# Patient Record
Sex: MALE | Race: WHITE
[De-identification: names, ages, dates, MRNs, and addresses within clinical notes are randomized per-mention and may not be internally consistent; named-entity substitution may affect disease eponyms.]

---

## 2021-08-26 ENCOUNTER — HOSPITAL ENCOUNTER (INPATIENT)
Dept: HOSPITAL 97 - ER | Age: 66
LOS: 1 days | DRG: 871 | End: 2021-08-27
Attending: HOSPITALIST | Admitting: HOSPITALIST
Payer: COMMERCIAL

## 2021-08-26 VITALS — BODY MASS INDEX: 61.3 KG/M2

## 2021-08-26 DIAGNOSIS — A41.9: Primary | ICD-10-CM

## 2021-08-26 DIAGNOSIS — K92.2: ICD-10-CM

## 2021-08-26 DIAGNOSIS — J69.0: ICD-10-CM

## 2021-08-26 DIAGNOSIS — Z20.822: ICD-10-CM

## 2021-08-26 DIAGNOSIS — E66.01: ICD-10-CM

## 2021-08-26 DIAGNOSIS — I46.9: ICD-10-CM

## 2021-08-26 DIAGNOSIS — N17.9: ICD-10-CM

## 2021-08-26 DIAGNOSIS — I47.2: ICD-10-CM

## 2021-08-26 LAB
ALBUMIN SERPL BCP-MCNC: 3.2 G/DL (ref 3.4–5)
ALP SERPL-CCNC: 66 U/L (ref 45–117)
ALT SERPL W P-5'-P-CCNC: 337 U/L (ref 12–78)
AST SERPL W P-5'-P-CCNC: 281 U/L (ref 15–37)
BUN BLD-MCNC: 17 MG/DL (ref 7–18)
COHGB MFR BLDA: 0.8 % (ref 0–1.5)
COHGB MFR BLDA: 0.9 % (ref 0–1.5)
GLUCOSE SERPLBLD-MCNC: 321 MG/DL (ref 74–106)
HCT VFR BLD CALC: 40 % (ref 39.6–49)
HCT VFR BLD CALC: 43 % (ref 39.6–49)
INR BLD: 1.08
LYMPHOCYTES # SPEC AUTO: 0.6 K/UL (ref 0.7–4.9)
LYMPHOCYTES # SPEC AUTO: 4.7 K/UL (ref 0.7–4.9)
MAGNESIUM SERPL-MCNC: 2.8 MG/DL (ref 1.8–2.4)
MORPHOLOGY BLD-IMP: (no result)
MORPHOLOGY BLD-IMP: (no result)
NT-PROBNP SERPL-MCNC: 192 PG/ML (ref ?–125)
OXYHGB MFR BLDA: 94.4 % (ref 94–97)
OXYHGB MFR BLDA: 97.2 % (ref 94–97)
PMV BLD: 8 FL (ref 7.6–11.3)
PMV BLD: 8.4 FL (ref 7.6–11.3)
POLYCHROMASIA BLD QL SMEAR: SLIGHT
POTASSIUM SERPL-SCNC: 4.3 MMOL/L (ref 3.5–5.1)
RBC # BLD: 3.91 M/UL (ref 4.33–5.43)
RBC # BLD: 4.51 M/UL (ref 4.33–5.43)
SAO2 % BLDA: 96.3 % (ref 92–98.5)
SAO2 % BLDA: 99 % (ref 92–98.5)
TROPONIN (EMERG DEPT USE ONLY): 0.45 NG/ML (ref 0–0.04)

## 2021-08-26 PROCEDURE — 84145 PROCALCITONIN (PCT): CPT

## 2021-08-26 PROCEDURE — 87205 SMEAR GRAM STAIN: CPT

## 2021-08-26 PROCEDURE — 0BH17EZ INSERTION OF ENDOTRACHEAL AIRWAY INTO TRACHEA, VIA NATURAL OR ARTIFICIAL OPENING: ICD-10-PCS

## 2021-08-26 PROCEDURE — 82947 ASSAY GLUCOSE BLOOD QUANT: CPT

## 2021-08-26 PROCEDURE — 94003 VENT MGMT INPAT SUBQ DAY: CPT

## 2021-08-26 PROCEDURE — 36415 COLL VENOUS BLD VENIPUNCTURE: CPT

## 2021-08-26 PROCEDURE — 93005 ELECTROCARDIOGRAM TRACING: CPT

## 2021-08-26 PROCEDURE — 92950 HEART/LUNG RESUSCITATION CPR: CPT

## 2021-08-26 PROCEDURE — 70450 CT HEAD/BRAIN W/O DYE: CPT

## 2021-08-26 PROCEDURE — 71045 X-RAY EXAM CHEST 1 VIEW: CPT

## 2021-08-26 PROCEDURE — 95816 EEG AWAKE AND DROWSY: CPT

## 2021-08-26 PROCEDURE — 87040 BLOOD CULTURE FOR BACTERIA: CPT

## 2021-08-26 PROCEDURE — 84484 ASSAY OF TROPONIN QUANT: CPT

## 2021-08-26 PROCEDURE — 86900 BLOOD TYPING SEROLOGIC ABO: CPT

## 2021-08-26 PROCEDURE — 83605 ASSAY OF LACTIC ACID: CPT

## 2021-08-26 PROCEDURE — 80048 BASIC METABOLIC PNL TOTAL CA: CPT

## 2021-08-26 PROCEDURE — 93306 TTE W/DOPPLER COMPLETE: CPT

## 2021-08-26 PROCEDURE — 82805 BLOOD GASES W/O2 SATURATION: CPT

## 2021-08-26 PROCEDURE — 83735 ASSAY OF MAGNESIUM: CPT

## 2021-08-26 PROCEDURE — 80076 HEPATIC FUNCTION PANEL: CPT

## 2021-08-26 PROCEDURE — 84100 ASSAY OF PHOSPHORUS: CPT

## 2021-08-26 PROCEDURE — 31500 INSERT EMERGENCY AIRWAY: CPT

## 2021-08-26 PROCEDURE — 94002 VENT MGMT INPAT INIT DAY: CPT

## 2021-08-26 PROCEDURE — 85025 COMPLETE CBC W/AUTO DIFF WBC: CPT

## 2021-08-26 PROCEDURE — 99292 CRITICAL CARE ADDL 30 MIN: CPT

## 2021-08-26 PROCEDURE — 5A1935Z RESPIRATORY VENTILATION, LESS THAN 24 CONSECUTIVE HOURS: ICD-10-PCS

## 2021-08-26 PROCEDURE — 93970 EXTREMITY STUDY: CPT

## 2021-08-26 PROCEDURE — 86901 BLOOD TYPING SEROLOGIC RH(D): CPT

## 2021-08-26 PROCEDURE — 83880 ASSAY OF NATRIURETIC PEPTIDE: CPT

## 2021-08-26 PROCEDURE — 51702 INSERT TEMP BLADDER CATH: CPT

## 2021-08-26 PROCEDURE — 06HY33Z INSERTION OF INFUSION DEVICE INTO LOWER VEIN, PERCUTANEOUS APPROACH: ICD-10-PCS

## 2021-08-26 PROCEDURE — 80053 COMPREHEN METABOLIC PANEL: CPT

## 2021-08-26 PROCEDURE — 86850 RBC ANTIBODY SCREEN: CPT

## 2021-08-26 PROCEDURE — 99291 CRITICAL CARE FIRST HOUR: CPT

## 2021-08-26 PROCEDURE — 85610 PROTHROMBIN TIME: CPT

## 2021-08-26 PROCEDURE — 85730 THROMBOPLASTIN TIME PARTIAL: CPT

## 2021-08-26 RX ADMIN — Medication SCH ML: at 21:00

## 2021-08-26 NOTE — RAD REPORT
EXAM DESCRIPTION:  RAD - Chest Single View - 8/26/2021 9:49 am

 

CLINICAL HISTORY:  md hinds

Chest pain.

 

COMPARISON:  No comparisons

 

FINDINGS:  Portable technique limits examination quality.

 

Endotracheal intubation is noted with tip at the level of the superior aortic arch. Enteric tube desc
ends into the stomach. Mild to moderate bilateral pulmonary opacities are present probably representi
ng pulmonary edema or pulmonary infection.Heart size is mildly prominent.

## 2021-08-26 NOTE — RAD REPORT
EXAM DESCRIPTION:  CT - Head Brain Wo Cont - 8/26/2021 11:45 am

 

CLINICAL HISTORY:  ams

Headache, drowsiness

 

COMPARISON:  No comparisons

 

TECHNIQUE:  All CT scans are performed using dose optimization technique as appropriate and may inclu
de automated exposure control or mA/KV adjustment according to patient size.

 

FINDINGS:  No intracranial hemorrhage, hydrocephalus or extra-axial fluid collection.No areas of brai
n edema or evidence of midline shift.

 

Nasopharyngeal soft tissues are prominent along prominence of the posterior nasal cavity soft tissues
. The calvarium is intact.

 

IMPRESSION:  No acute intracranial abnormality.

## 2021-08-26 NOTE — ER
Nurse's Notes                                                                                     

 [facility]                                                                 

Name: Theo Minor                                                                          

Age: 66 yrs                                                                                       

Sex: Male                                                                                         

: 1955                                                                                   

MRN: [id number]                                                                                   

Arrival Date: 2021                                                                          

Time: 08:36                                                                                       

Account#: [id number]                                                                            

Bed 2                                                                                             

Private MD:                                                                                       

Diagnosis: Respiratory arrest;Cardiac arrest, cause unspecified                                   

                                                                                                  

Presentation:                                                                                     

                                                                                             

08:33 Chief complaint: EMS states: FOUND DOWN IN FULL ARREST. Care prior to arrival:          bp  

      Medication(s) given: EPI x3 IV initiated. LEFT TIB IO. Compressions began prior to          

      arrival.                                                                                    

08:33 Method Of Arrival: EMS: [facility]                                                    bp  

08:33 Acuity: AFTAB 1                                                                           bp  

08:33 Coronavirus screen: Client presents with at least one sign or symptom that may indicate bp  

      coronavirus-19. Provider contacted for isolation considerations. Ebola Screen: No           

      symptoms or risks identified at this time. Initial Sepsis Screen: Does the patient meet     

      any 2 criteria? Temp <36.0*C (96.8*F)) or > 38.3*C (100.9*F). Altered Mental Status.        

      Does the patient have a suspected source of infection? No. Patient's initial sepsis         

      screen is negative. Risk Assessment: Do you want to hurt yourself or someone else?          

      Patient reports no desire to harm self or others. Onset of symptoms was 2021     

      at 07:30. Transition of care: patient was received from another setting of care             

      (long-term care facility), [facility].                                         

                                                                                                  

Triage Assessment:                                                                                

08:33 General: SEE CPR SHEET. Pain: Unable to use pain scale. Patient is unresponsive.        bp  

                                                                                                  

Historical:                                                                                       

- Allergies:                                                                                      

10:31 No Known Allergies;                                                                     bp  

- Home Meds:                                                                                      

10:31 Unable to obtain [Active];                                                              bp  

- PMHx:                                                                                           

10:31 Unable to Obtain;                                                                       bp  

                                                                                                  

- Immunization history:: Adult Immunizations unknown, UNKNOWN.                                    

- Social history:: Smoking status: unknown.                                                       

                                                                                                  

                                                                                                  

Screenin:33 Abuse screen: Denies threats or abuse. Denies injuries from another. Nutritional        bp  

      screening: No deficits noted. Tuberculosis screening: No symptoms or risk factors           

      identified.                                                                                 

                                                                                                  

Assessment:                                                                                       

08:33 CPR assessment: unresponsive, pupils fixed \T\ dilated, no respiratory effort, intubated, bp

      Ambu ventilation, pulses present w/ compressions. Cardiac rhythm is PEA. General:           

      Appears obese, Behavior is unresponsive. Neuro: Level of Consciousness is unresponsive,     

      Oriented to none. EENT: No deficits noted. Cardiovascular: Rhythm is PEA. Respiratory:      

      Airway via oral intubation Respiratory effort is NONE Respiratory pattern is NONE           

      Ventilator assessment: ET Tube: 7.5 21cm. at gum line. Ventilator Mode: Assist Control      

      (AC) Tidal Volume: 610 FiO2: 100%. Pressure Support: 0 HOB > 30 degrees. Breath sounds      

      with crackles bilaterally. GI: Abdomen is obese. : No signs and/or symptoms were          

      reported regarding the genitourinary system. Derm: No deficits noted.                       

08:35 Reassessment: ROSC.                                                                     bp  

08:45 Reassessment: PEA ON MONITOR. CPR RESTARTED.                                            bp  

08:48 Reassessment: ROSC. .                                                            bp  

10:30 Reassessment: TRANSFER INITIATED. PT ON PERLA AC16, , FIO2 100, DOPAMINE AT 10      bp  

      MCG/KG/MIN, EPI AT 2MCG/MIN.                                                                

11:15 Reassessment: TO CT WITH RT AND RAD TECH.                                               bp  

11:30 Reassessment: RETURNED FROM CT.                                                         bp  

13:31 Reassessment: Checked with [facility] about transfer, [facility] with the transfer   aa5 

      center states they are still pending CCU bed. Dr. Swift was notified of update.          

15:00 Reassessment: Reassessment: EEG AT B/S. EEG GROSSLY ABNORMAL. MD NOTIFIED. Neuro: Level bp  

      of Consciousness is unresponsive, Oriented to none.                                         

16:00 Reassessment: [facility] CONTACTED. PER STAFF, PT HAS NO LIVING FAMILY AND NO CLOSE    bp  

      ASSOCIATES, 2/2 HOMELESSNESS. Neuro: Level of Consciousness is unresponsive, Oriented       

      to none.                                                                                    

17:00 [facility]ass[facility]. PT DOES NOT CURRENTLY MEET CRITERIA FOR         bp  

      DONATION 2/2 PH <7.3.                                                                       

                                                                                                  

Vital Signs:                                                                                      

08:33 Weight 175 kg;                                                                          bp  

08:45  / 78; Pulse 81; Resp 18; Pulse Ox 100% on 100% FiO2 ETT vent;                    bp  

09:00 BP 98 / 55; Pulse 67; Resp 16; Pulse Ox 99% ;                                           bp  

09:15 BP 71 / 52; Pulse 68; Resp 18; Temp 95.7; Pulse Ox 98% ;                                bp  

09:30  / 63; Pulse 84; Resp 14; Temp 97.8; Pulse Ox 98% ;                               bp  

09:45  / 60; Pulse 104; Resp 12; Temp 98.2; Pulse Ox 99% ;                              bp  

10:00  / 55; Pulse 94; Resp 14; Temp 97.6; Pulse Ox 100% ;                              bp  

10:15  / 48; Pulse 82; Resp 16; Temp 97.2; Pulse Ox 99% ;                               bp  

10:30  / 49; Pulse 79; Resp 16; Temp 97; Pulse Ox 99% ;                                 bp  

10:45  / 58; Pulse 76; Resp 16; Temp 96.8; Pulse Ox 98% ;                               bp  

11:00  / 54; Pulse 73; Resp 16; Temp 96.5; Pulse Ox 98% ;                               bp  

11:15  / 47; Pulse 73; Resp 10; Temp 96.5; Pulse Ox 99% ;                               bp  

11:30 BP 75 / 46; Pulse 71; Resp 16; Temp 96.3; Pulse Ox 98% ;                                bp  

11:45 BP 71 / 40; Pulse 71; Resp 15; Temp 96.3; Pulse Ox 98% ;                                bp  

12:00 BP 90 / 38; Pulse 61; Resp 16; Temp 96.2; Pulse Ox 92% ;                                bp  

12:30 BP 76 / 44; Pulse 62; Resp 16; Temp 96; Pulse Ox 100% ;                                 bp  

13:00 BP 68 / 47; Pulse 57; Resp 16; Temp 96; Pulse Ox 100% ;                                 bp  

13:30 BP 72 / 43; Pulse 56; Resp 16; Temp 96.; Pulse Ox 100% ;                                bp  

14:00 BP 85 / 42; Pulse 55; Resp 17; Temp 96.9; Pulse Ox 100% ;                               bp  

14:30 BP 86 / 48; Pulse 58; Resp 20; Temp 95.8; Pulse Ox 100% ;                               bp  

15:00 BP 88 / 43; Pulse 58; Resp 21; Temp 95.8; Pulse Ox 100% ;                               bp  

15:30 BP 89 / 59; Pulse 59; Resp 22; Temp 95.8; Pulse Ox 100% ;                               bp  

16:00 BP 92 / 51; Pulse 61; Resp 20; Temp 95.9; Pulse Ox 100% ;                               bp  

16:30 BP 94 / 59; Pulse 65; Resp 22; Temp 96.1; Pulse Ox 100% ;                               bp  

17:00  / 50; Pulse 60; Resp 21; Temp 96.5; Pulse Ox 100% ;                              bp  

17:30  / 72; Pulse 74; Resp 18; Temp 97.1; Pulse Ox 100% ;                              bp  

                                                                                                  

ED Course:                                                                                        

08:33 Patient has correct armband on for positive identification. Placed in gown. Bed in low  bp  

      position. Side rails up X2. Intubation: Ventilated with 100% bag valve mask (BVM) prior     

      to procedure. 7.5 Fr. ETT placed orally. BY EMS PTA. Placement verified by CXR,             

      auscultating bilateral breath sounds, Ventilated with Ambu bag. ventilator.                 

08:36 Patient arrived in ED.                                                                  am2 

08:40 Inserted saline lock: 18 gauge in left antecubital area, using aseptic technique. Blood dh3 

      collected.                                                                                  

09:11 Richardson cath inserted, using sterile technique, 16 Fr., by me, balloon inflated, to       dh3 

      gravity drainage, returned clear yellow urine. Patient tolerated CPR/intubated.             

09:27 Marek Swift MD is Attending Physician.                                             [facility] 

09:34 Ag Grajeda PA is PHCP.                                                              [facility] 

09:41 Jorden Malone, RN is Primary Nurse.                                                    bp  

09:48 First set of blood cultures drawn by ED staff.                                          dh3 

09:49 XRAY Chest (1 view) In Process Unspecified.                                             EDMS

09:51 Second set of blood cultures drawn by ED staff.                                         dh3 

09:59 Lactate Sent.                                                                           dh3 

10:01 Triage completed.                                                                       bp  

10:31 Arm band placed on right wrist.                                                         bp  

10:36 Notified Nurse Practitioner and/or Physician Assistant of a critical lab result(s),     sv  

      lactate-10.1.                                                                               

10:57 Transfer initiated with [facility].                               em1 

11:45 CT Head Brain wo Cont In Process Unspecified.                                           EDMS

14:20 Transfer declined due to capacity at [facility].                                             em1 

14:24 Transfer initiated with [facility].                          em1 

14:34 Transfer declined due to capacity at [facility].                                             em1 

17:43 Zita Lomax MD is Hospitalizing Provider.                                           [facility] 

                                                                                             

07:00 No provider procedures requiring assistance completed. Patient admitted, IV remains in  sv  

      place. intact.                                                                              

07:18 Primary Nurse role handed off by Jorden Malone, RN                                       

07:18 Mallorie, Roslyn, RN is Primary Nurse.                                                  sv  

                                                                                                  

Administered Medications:                                                                         

                                                                                             

13:31 Discontinued: Epinephrine Drip - (EPINEPHrine (PF) 4 mg, Sodium Chloride 0.9% 250 ml)   edmundo 

      IV at calculated rate Per protocol; Start at 1 mcg/min; titrate upto 10 mcg/min to          

      maintain SBP>90 mmHg                                                                        

09:30 Drug: Dopamine drip 5 mcg/kg/min - (DOPamine 400 mg, D5W 250 ml) Route: IV; Rate:       bp  

      calculated rate; Site: right femoral;                                                       

09:30 Drug: Epinephrine Drip - (EPINEPHrine (PF) 4 mg, Sodium Chloride 0.9% 250 ml) Route:    bp  

      IV; Rate: calculated rate; Site: right femoral;                                             

11:05 Drug: ProTONIX (pantoprazole) 40 mg Route: IVP; Site: right femoral;                    bp  

11:15 Drug: Zosyn (piperacillin-tazobactam) 3.375 grams Route: IVPB; Infused Over: 60 mins;   bp  

      Site: right upper arm;                                                                      

13:00 Drug: Levophed (norepinephrine) (4 mg/250 mL D5W 4 mcg/min Route: IV; Rate: calculated  bp  

      rate; Site: right femoral;                                                                  

                                                                                             

04:15 Drug: Gelacio-Synephrine (phenylephrine) 100 mcg/min Route: IV; Rate: calculated rate;      jb4 

      Site: right femoral;                                                                        

                                                                                                  

                                                                                                  

Outcome:                                                                                          

                                                                                             

10:40 ER care complete, transfer ordered by MD.                                               edmundo 

17:44 Decision to Hospitalize by Provider.                                                    jaky 

                                                                                             

07:00 Admitted to ER Hold.  Please see [facility] for further documentation.                    sv  

      Instructed on the need for admit.                                                           

18:36 Patient left the ED.                                                                      

                                                                                                  

Signatures:                                                                                       

Dispatcher [employer]                           EDMS                                                 

Roslyn Nobles, RN                    RN   Marek Nickerson MD MD cha Mickail, Joel, PA                       PA   Jung Salazar                               em1                                                  

Sagrario Thomas, RN                     RN   pravin5                                                  

Frederick Palacios RN                       RN   jb4                                                  

Staci Cuenca Deanna                              3                                                  

Jorden Malone RN                      RN   bp                                                   

                                                                                                  

Corrections: (The following items were deleted from the chart)                                    

                                                                                             

10:32 10:31 PMHx: Angina pectoris; bp                                                         bp  

                                                                                                  

**************************************************************************************************

## 2021-08-26 NOTE — EDPHYS
Physician Documentation                                                                           

 [facility]                                                                 

Name: Theo Minor                                                                          

Age: 66 yrs                                                                                       

Sex: Male                                                                                         

: 1955                                                                                   

MRN: [id number]                                                                                   

Arrival Date: 2021                                                                          

Time: 08:36                                                                                       

Account#: [id number]                                                                            

Bed 2                                                                                             

Private MD:                                                                                       

ED Physician Marek Swift                                                                      

HPI:                                                                                              

                                                                                             

09:45 This 66 yrs old  Male presents to ER via EMS with complaints of CPR.           [facility] 

09:45 Preceding the arrest, the patient was found down by nursing home staff. The arrest      jmm 

      occurred at nursing home. Pre-hospital course: The arrest was not witnessed by others.      

      EMS care prior to arrival: initiation of ACLS, peripheral IV, intubation ACLS details:      

      Airway: oral intubation, Medications given by EMS prior to arrival -. It is unknown         

      whether or not the patient has had similar symptoms in the past.                            

                                                                                                  

Historical:                                                                                       

- Allergies:                                                                                      

10:31 No Known Allergies;                                                                     bp  

- Home Meds:                                                                                      

10:31 Unable to obtain [Active];                                                              bp  

- PMHx:                                                                                           

10:31 Unable to Obtain;                                                                       bp  

                                                                                                  

- Immunization history:: Adult Immunizations unknown, UNKNOWN.                                    

- Social history:: Smoking status: unknown.                                                       

                                                                                                  

                                                                                                  

ROS:                                                                                              

09:45 Unable to obtain ROS due to comatose state.                                             [facility] 

                                                                                                  

Exam:                                                                                             

09:45 Constitutional: The patient appears obese.                                              [facility] 

09:45 Head/face: Noted is cyanosis noted.                                                         

09:45 Eyes: Conjunctiva: normal.                                                                  

09:45 Chest/axilla: cyanotic.                                                                     

09:45 Cardiovascular: no palpable pulse.                                                          

09:45 Respiratory: intubated.                                                                     

09:45 Abdomen/GI: Inspection: obese                                                               

09:45 Musculoskeletal/extremity:                                                                  

09:45 Neuro: Orientation: unable to test, the patient is intubated, Mentation: unable to          

      test, the patient is intubated, Memory: unable to test, the patient is intubated.           

09:45 Psych:                                                                                      

                                                                                                  

Vital Signs:                                                                                      

08:33 Weight 175 kg;                                                                          bp  

08:45  / 78; Pulse 81; Resp 18; Pulse Ox 100% on 100% FiO2 ETT vent;                    bp  

09:00 BP 98 / 55; Pulse 67; Resp 16; Pulse Ox 99% ;                                           bp  

09:15 BP 71 / 52; Pulse 68; Resp 18; Temp 95.7; Pulse Ox 98% ;                                bp  

09:30  / 63; Pulse 84; Resp 14; Temp 97.8; Pulse Ox 98% ;                               bp  

09:45  / 60; Pulse 104; Resp 12; Temp 98.2; Pulse Ox 99% ;                              bp  

10:00  / 55; Pulse 94; Resp 14; Temp 97.6; Pulse Ox 100% ;                              bp  

10:15  / 48; Pulse 82; Resp 16; Temp 97.2; Pulse Ox 99% ;                               bp  

10:30  / 49; Pulse 79; Resp 16; Temp 97; Pulse Ox 99% ;                                 bp  

10:45  / 58; Pulse 76; Resp 16; Temp 96.8; Pulse Ox 98% ;                               bp  

11:00  / 54; Pulse 73; Resp 16; Temp 96.5; Pulse Ox 98% ;                               bp  

11:15  / 47; Pulse 73; Resp 10; Temp 96.5; Pulse Ox 99% ;                               bp  

11:30 BP 75 / 46; Pulse 71; Resp 16; Temp 96.3; Pulse Ox 98% ;                                bp  

11:45 BP 71 / 40; Pulse 71; Resp 15; Temp 96.3; Pulse Ox 98% ;                                bp  

12:00 BP 90 / 38; Pulse 61; Resp 16; Temp 96.2; Pulse Ox 92% ;                                bp  

12:30 BP 76 / 44; Pulse 62; Resp 16; Temp 96; Pulse Ox 100% ;                                 bp  

13:00 BP 68 / 47; Pulse 57; Resp 16; Temp 96; Pulse Ox 100% ;                                 bp  

13:30 BP 72 / 43; Pulse 56; Resp 16; Temp 96.; Pulse Ox 100% ;                                bp  

14:00 BP 85 / 42; Pulse 55; Resp 17; Temp 96.9; Pulse Ox 100% ;                               bp  

14:30 BP 86 / 48; Pulse 58; Resp 20; Temp 95.8; Pulse Ox 100% ;                               bp  

15:00 BP 88 / 43; Pulse 58; Resp 21; Temp 95.8; Pulse Ox 100% ;                               bp  

15:30 BP 89 / 59; Pulse 59; Resp 22; Temp 95.8; Pulse Ox 100% ;                               bp  

16:00 BP 92 / 51; Pulse 61; Resp 20; Temp 95.9; Pulse Ox 100% ;                               bp  

16:30 BP 94 / 59; Pulse 65; Resp 22; Temp 96.1; Pulse Ox 100% ;                               bp  

17:00  / 50; Pulse 60; Resp 21; Temp 96.5; Pulse Ox 100% ;                              bp  

17:30  / 72; Pulse 74; Resp 18; Temp 97.1; Pulse Ox 100% ;                              bp  

                                                                                                  

Procedures:                                                                                       

09:45 Central Line: the site was prepped with in sterile fashion, a triple lumen catheter was [facility] 

      inserted, in the right femoral vein, in 2 attempts. placement was verified, by blood        

      return, the site was dressed with Tegaderm, using sterile technique.                        

                                                                                                  

MDM:                                                                                              

09:27 Patient medically screened.                                                             edmundo 

15:47 Data reviewed: vital signs, nurses notes. Counseling: I had a detailed discussion with  [facility] 

      the patient and/or guardian regarding: the historical points, exam findings, and any        

      diagnostic results supporting the discharge/admit diagnosis. ED course: I discussed the     

      patient with [facility]. Patient record number [id number] - [id number].                             

17:39 Counseling: I had a detailed discussion with the patient and/or guardian regarding:. ED [facility] 

      course: Patient remains on Levophed and dopamine drip. Patient was administered Zosyn       

      following chest x-ray results. I discussed the patient with Dr. Muir who will            

      reevaluate the EEG. Patient will need further evaluation before he can be a candidate       

      for organ donation. I discussed the patient with Dr. Lomax whom accepted the patient for     

      admission. .                                                                                

                                                                                                  

                                                                                             

08:53 Order name: Glucose, Ancillary Testing; Complete Time: 09:35                            EDMS

                                                                                             

09:00 Order name: Basic Metabolic Panel; Complete Time: 09:35                                 aa 

                                                                                             

09:00 Order name: CBC with Diff; Complete Time: 09:48                                         aa5 

                                                                                             

09:00 Order name: LFT's; Complete Time: 09:35                                                 aa5 

                                                                                             

09:00 Order name: Magnesium; Complete Time: 09:35                                             aa 

                                                                                             

09:00 Order name: NT PRO-BNP; Complete Time: :35                                            aa 

                                                                                             

09:00 Order name: PT-INR; Complete Time: :35                                                                                                                                             

09:00 Order name: Troponin (emerg Dept Use Only); Complete Time: 09:35                        [facility] 

                                                                                             

09:09 Order name: Manual Differential; Complete Time: 09:48                                   EDMS

                                                                                             

09:11 Order name: Blood Culture Adult (2)                                                     em1 

                                                                                             

09:12 Order name: Blood Culture                                                               EDMS

                                                                                             

09:28 Order name: ABG; Complete Time: 16:10                                                   em1 

                                                                                             

09:43 Order name: Lactate                                                                     bp  

                                                                                             

09:43 Order name: Lactate; Complete Time: 10:34                                               EDMS

                                                                                             

11:53 Order name: SARS-COV-2 RT PCR; Complete Time: 11:56                                     MS

                                                                                             

15:07 Order name: Lactate Sepsis 2 HR Follow-up; Complete Time: 15:14                         EDMS

                                                                                             

16:12 Order name: ABG; Complete Time: 02:13                                                   m 

                                                                                             

19:30 Order name: Comprehensive Metabolic Panel                                               EDMS

                                                                                             

19:30 Order name: Comprehensive Metabolic Panel; Complete Time: 15:09                         [facility]

                                                                                             

19:31 Order name: CBC with Automated Diff                                                     EDMS

                                                                                             

19:31 Order name: CBC with Automated Diff; Complete Time: 15:09                               MS

                                                                                             

19:31 Order name: Magnesium                                                                   EDMS

                                                                                             

19:31 Order name: Magnesium; Complete Time: 15:09                                             MS

                                                                                             

19:31 Order name: NT PRO-BNP                                                                  [facility]

                                                                                             

19:31 Order name: NT PRO-BNP; Complete Time: 15:09                                            MS

                                                                                             

19:31 Order name: Phosphorus                                                                  EDMS

                                                                                             

19:31 Order name: Phosphorus; Complete Time: 15:09                                            MS

                                                                                             

19:31 Order name: ABG Arterial Blood Gas                                                      [facility]

                                                                                             

09:00 Order name: XRAY Chest (1 view); Complete Time: 10:07                                   [facility] 

                                                                                             

09:00 Order name: EKG; Complete Time: 09:01                                                   5 

                                                                                             

09:00 Order name: Cardiac monitoring; Complete Time: 09:31                                    [facility] 

                                                                                             

09:00 Order name: EKG - Nurse/Tech; Complete Time: 09:31                                      [facility] 

                                                                                             

11:03 Order name: CT Head Brain wo Cont; Complete Time: 11:56                                 [facility] 

                                                                                             

14:00 Order name: EEG Request                                                                 [facility]

                                                                                             

19:31 Order name: Tube Feeding                                                                EDMS

                                                                                             

19:31 Order name: Echo with Doppler                                                           EDMS

                                                                                             

19:31 Order name: Echo with Doppler                                                           EDMS

                                                                                             

19:31 Order name: EEG Request                                                                 EDMS

                                                                                             

19:31 Order name: EEG Request                                                                 EDMS

                                                                                             

19:31 Order name: ABG Arterial Blood Gas                                                      EDMS

                                                                                             

19:31 Order name: Extrem Venous W Compress [place]                                                EDMS

                                                                                             

19:31 Order name: Extrem Venous W Compress [place]; Complete Time: 15:09                          EDMS

                                                                                             

19:32 Order name: CONS Physician Consult                                                      EDMS

                                                                                             

21:43 Order name: CBC with Automated Diff; Complete Time: 02:13                               EDMS

                                                                                             

22:18 Order name: Manual Differential; Complete Time: 02:13                                   EDMS

                                                                                             

22:37 Order name: Type and Screen; Complete Time: 02:13                                       EDMS

                                                                                             

23:13 Order name: ABO/RH no charge; Complete Time: 02:                                      EDMS

                                                                                             

02:44 Order name: Glucose, Ancillary Testing; Complete Time: 15:09                            EDMS

                                                                                             

04:24 Order name: ABG Arterial Blood Gas; Complete Time: 15:09                                EDMS

                                                                                             

06:46 Order name: Protime (+INR); Complete Time: 15:09                                        EDMS

                                                                                             

06:46 Order name: PTT, Activated Partial Thromb; Complete Time: 15:09                         EDMS

                                                                                             

07:07 Order name: NT PRO-BNP; Complete Time: 15:09                                            EDMS

                                                                                             

07:24 Order name: Procalcitonin; Complete Time: 15:09                                         EDMS

                                                                                             

12:04 Order name: Troponin I; Complete Time: 15:09                                            EDMS

                                                                                             

09:00 Order name: IV Saline Lock; Complete Time: 09:32                                        aa5 

                                                                                             

09:00 Order name: Labs collected and sent; Complete Time: 09:41                               aa5 

                                                                                             

09:00 Order name: O2 Per Protocol; Complete Time: 09:32                                       aa5 

                                                                                             

09:00 Order name: O2 Sat Monitoring; Complete Time: :32                                     aa5 

                                                                                                  

Administered Medications:                                                                         

13:31 Discontinued: Epinephrine Drip - (EPINEPHrine (PF) 4 mg, Sodium Chloride 0.9% 250 ml)   edmundo 

      IV at calculated rate Per protocol; Start at 1 mcg/min; titrate upto 10 mcg/min to          

      maintain SBP>90 mmHg                                                                        

09:30 Drug: Dopamine drip 5 mcg/kg/min - (DOPamine 400 mg, D5W 250 ml) Route: IV; Rate:       bp  

      calculated rate; Site: right femoral;                                                       

09:30 Drug: Epinephrine Drip - (EPINEPHrine (PF) 4 mg, Sodium Chloride 0.9% 250 ml) Route:    bp  

      IV; Rate: calculated rate; Site: right femoral;                                             

11:05 Drug: ProTONIX (pantoprazole) 40 mg Route: IVP; Site: right femoral;                    bp  

11:15 Drug: Zosyn (piperacillin-tazobactam) 3.375 grams Route: IVPB; Infused Over: 60 mins;   bp  

      Site: right upper arm;                                                                      

13:00 Drug: Levophed (norepinephrine) (4 mg/250 mL D5W 4 mcg/min Route: IV; Rate: calculated  bp  

      rate; Site: right femoral;                                                                  

                                                                                             

04:15 Drug: Gelacio-Synephrine (phenylephrine) 100 mcg/min Route: IV; Rate: calculated rate;      jb4 

      Site: right femoral;                                                                        

                                                                                                  

                                                                                                  

Disposition Summary:                                                                              

21 17:44                                                                                    

Hospitalization Ordered                                                                           

      Hospitalization Status: Inpatient Admission                                             [facility] 

      Provider: Zita Lomax 

      Condition: Stable(21 17:44)                                                       jmm 

      Problem: new(21 17:44)                                                            jmm 

      Symptoms: are unchanged(21 17:44)                                                 [facility] 

      Bed/Room Type: Standard                                                                 [facility] 

      Location: [facility] ER HOLD(21 19:44)                                                    

      Room Assignment: ERHOLD-(21 19:44)                                                  

      Diagnosis                                                                                   

        - Respiratory arrest                                                                  jmm 

        - Cardiac arrest, cause unspecified                                                   [facility] 

      Forms:                                                                                      

        - Medication Reconciliation Form                                                      jmm 

        - SBAR form                                                                           [facility] 

Addendum:                                                                                         

2021                                                                                        

     08:30 Co-signature as Attending Physician, Marek Swift MD I agree with the assessment and  c
ha

           plan of care.                                                                          

                                                                                                  

Signatures:                                                                                       

Dispatcher [employer]                           EDPaula Whittaker RN                        Marek Camarillo MD MD cha Mickail, Joel, PA                       PA   [id number]                                                  

Walter Cunningham MD MD rn Calderon, Audri, RN                     RN   [id number]                                                  

Frederick Palacios RN                       RN   [id number]                                                  

Jorden Malone RN                      RN   bp                                                   

                                                                                                  

Corrections: (The following items were deleted from the chart)                                    

                                                                                             

10:32 10:31 PMHx: Angina pectoris; bp                                                         bp  

10:54 09:49 CORONAVIRUS+MR.LAB.BRZ ordered. EDMS                                              EDMS

14:46 10:40 icu edmundo                                                                           [facility] 

14:46 10:40 Other Acute Care Facility edmundo                                                     [facility] 

14:46 10:40 Higher level of care [facility] 

14:46 10:40 Serious edmundo                                                                       [facility] 

14:46 10:40 new [facility] 

14:46 10:40 have improved edmundo                                                                 [facility] 

14:46 10:40 Acute respiratory failure edmundo                                                     [facility] 

14:46 10:40 Cardiac arrest due to other underlying condition - respiratory failure [facility] 

14:46 10:40 Morbid (severe) obesity with alveolar hypoventilation [facility] 

14:46 10:40 Hypotension, unspecified [facility] 

19:44 17:44 Telemetry/MedSurg (Inpatient) [facility]                                                 dw  

19:44 17:44 [facility]                                                                               dw  

                                                                                                  

**************************************************************************************************

## 2021-08-26 NOTE — P.HP
Certification for Inpatient


Patient admitted to: Inpatient


With expected LOS: >2 Midnights


Patient will require the following post-hospital care: None


Practitioner: I am a practitioner with admitting privileges, knowledge of 

patient current condition, hospital course, and medical plan of care.


Services: Services provided to patient in accordance with Admission requirements

found in Title 42 Section 412.3 of the Code of Federal Regulations





Patient History


Date of Service: 08/26/21


Reason for admission: Cardiac arrest


History of Present Illness: 





Patient is a 66-year-old gentlemen who he presents to our hospital with cardiac 

arrest.  Patient is a resident at [facility].  Patient was apparently

picked up from a homeless shelter in the [facility] and was transfer to 

[facility].  Patient was walking to the shower when patient fell.  It

appeared patient was unresponsive and did not have a pulse so they started CPR 

and EMS was notified.  When EMS arrived patient was asystole and CPR was 

continued.  Patient was given epi x 3 and intraosseous catheter obtained in the 

tibia.  Patient was brought to our hospital were there were able to obtain a 

pulse.  Patient was hypotensive and was started on Levophed and also given 

dopamine.  Transfer was initiated to [facility]  but no bed 

availability.  At the same time EEG was performed which appear to have very 

little activity.  Initially it was felt that patient may not have any brain 

activity but when seen by Neurology patient had a burst suppression pattern.  

Patient unlikely to survive this event.  Most likely will not have much brain 

activity over the next 48-72 hr.  Will go ahead and continue with hydration and 

pressor support.  Start Protonix drip.  Get Cardiology and Pulmonary 

consultation along with a neurology consultation.  As patient's prognosis is 

poor and  hemodynamically and neurologically unstable for transfer will go ahead

and keep him at our facility for admission and repeat EEG in the a.m..





I did speak to the [facility] director, and they stated that the only 

information they have on the patient is his Medicare and Medicaid and number.  

They do not have a 's license nor do they have any contact information 

that is working.  They had a number to an Dave Falk but the phone number that 

we were given is not working.  Will get  to assist and try to 

figure out a contact to the patient.


Allergies





No Known Allergies Allergy (Unverified 08/26/21 09:22)


   








- Past Medical/Surgical History


-: Unknown


-: Unknown





- Family History


  ** Father


Family History: Reviewed- Non-Contributory





- Social History


Smoking Status: Unknown if ever smoked


Smoking therapy provided: No


Patient receptive to therapy: No


Alcohol use: No


CD- Drugs: No


Caffeine use: No





Review of Systems


is unable to be obtained





Physical Examination





- Vital Signs


Temperature: 98 F


Blood Pressure: 100/60


Pulse: 60


Respirations: 18


Pulse Ox (%): 95





- Physical Exam


General: Other (intubated and )


HEENT: Atraumatic, Normocephalic


Neck: Supple, 2+ carotid pulse no bruit, JVD not distended


Respiratory: Diminished


Cardiovascular: Regular rate/rhythm, Normal S1 S2, Systolic murmur


Gastrointestinal: Normal bowel sounds, Soft and benign, Non-distended


Musculoskeletal: No clubbing, No swelling, No contractures


Integumentary: No rashes


Neurological: Other





- Studies


Laboratory Data (last 24 hrs)





08/26/21 09:00: PT 12.4, INR 1.08


08/26/21 09:00: WBC 7.50, Hgb 12.0 L, Hct 40.0, Plt Count 235


08/26/21 09:00: Sodium 137, Potassium 4.3, BUN 17, Creatinine 1.48 H, Glucose 

321 H, Magnesium 2.8 H, Total Bilirubin 0.3,  H,  H*, Alkaline 

Phosphatase 66








Assessment & Plan





- Problems (Diagnosis)


(1) Cardiac arrest


Status: Acute   





(2) GIB (gastrointestinal bleeding)


Status: Acute   





(3) MICHAEL (acute kidney injury)


Status: Acute   





- Plan





Plan: 


1. Patient with very little brain activity. Patient had a burst suppression 

pattern noted by neurology. At this time, patient is a full code and will 

continue to do whatever we can. Repeat EEG in the morning. Continue with 

vasopressor support. Continuous sedation as needed. Continue antibiotic therapy.




Discharge Plan: Other


Plan to discharge in: Greater than 2 days





- Advance Directives


Does patient have a Living Will: No


Does patient have a Durable POA for Healthcare: No


Critical Care: Yes


Time Spent Managing PTS Care (In Minutes): 60

## 2021-08-27 VITALS — OXYGEN SATURATION: 95 %

## 2021-08-27 LAB
ALBUMIN SERPL BCP-MCNC: 3.1 G/DL (ref 3.4–5)
ALP SERPL-CCNC: 74 U/L (ref 45–117)
ALT SERPL W P-5'-P-CCNC: 548 U/L (ref 12–78)
AST SERPL W P-5'-P-CCNC: 718 U/L (ref 15–37)
BUN BLD-MCNC: 39 MG/DL (ref 7–18)
COHGB MFR BLDA: 0.6 % (ref 0–1.5)
GLUCOSE SERPLBLD-MCNC: 187 MG/DL (ref 74–106)
HCT VFR BLD CALC: 39.8 % (ref 39.6–49)
INR BLD: 1.17
LYMPHOCYTES # SPEC AUTO: 1.2 K/UL (ref 0.7–4.9)
MAGNESIUM SERPL-MCNC: 1.8 MG/DL (ref 1.8–2.4)
OXYHGB MFR BLDA: 95.1 % (ref 94–97)
PMV BLD: 8.2 FL (ref 7.6–11.3)
POTASSIUM SERPL-SCNC: 5.8 MMOL/L (ref 3.5–5.1)
RBC # BLD: 4.17 M/UL (ref 4.33–5.43)
SAO2 % BLDA: 96.9 % (ref 92–98.5)

## 2021-08-27 RX ADMIN — PIPERACILLIN SODIUM AND TAZOBACTAM SODIUM SCH MLS: 3; .375 INJECTION, POWDER, LYOPHILIZED, FOR SOLUTION INTRAVENOUS at 08:15

## 2021-08-27 RX ADMIN — Medication SCH ML: at 08:15

## 2021-08-27 RX ADMIN — PIPERACILLIN SODIUM AND TAZOBACTAM SODIUM SCH MLS: 3; .375 INJECTION, POWDER, LYOPHILIZED, FOR SOLUTION INTRAVENOUS at 02:35

## 2021-08-27 RX ADMIN — SODIUM CHLORIDE PRN MLS: 0.9 INJECTION, SOLUTION INTRAVENOUS at 11:08

## 2021-08-27 RX ADMIN — SODIUM CHLORIDE PRN MLS: 0.9 INJECTION, SOLUTION INTRAVENOUS at 08:15

## 2021-08-27 NOTE — RAD REPORT
EXAM DESCRIPTION:  RAD - Chest Single View - 8/27/2021 5:16 am

 

CLINICAL HISTORY:  The patient is 66 years old and is Male; Hypoxia

 

TECHNIQUE:  Frontal view of the chest.

 

COMPARISON:  No relevant prior studies available.

 

FINDINGS:  Lungs:   Scattered interstitial and airspace opacities bilaterally.

   Pleural space:   Unremarkable.   No pneumothorax.

   Heart:   Unremarkable.

   Mediastinum:   Unremarkable.

   Bones/joints:   Unremarkable.

   Tubes, lines and devices:   Endotracheal tube with tip 4 cm above the rich.

         Nasogastric tube with tip overlying the upper chest.

 

IMPRESSION:  1.   Scattered interstitial and airspace opacities bilaterally.

2.   Endotracheal tube with tip 4 cm above the rich.

3.   Nasogastric tube with tip overlying the upper chest.

 

Electronically signed by:   Florian Potter MD   8/27/2021 5:36 AM CDT Workstation: [id number]

 

 

Due to temporary technical issues with the PACS/Fluency reporting system, reports are being signed by
 the in house radiologist without review as a courtesy to ensure prompt reporting. The interpreting r
adiologist is fully responsible for the content of the report.

## 2021-08-27 NOTE — EEG
CHART:  [id number]

TEST ID#:  [id number]

DATE OF STUDY:  08/26/2021



THE EEG WAS RECORDED PORTABLE IN THER EMERGENCY ROOM ON A 17 CHANNEL MACHINE.  ELECTRODES 
WERE APPLIED IN THE USUAL MANNER USING THE INTERNATIONAL 10-20 SYSTEM.



THE EEG CONSISTS OF A SUPPRESSION-BURST BACKGROUND WITH EPISODES OF NO CEREBRAL ACTIVITY 
LASTING 10 TO 40 SECONDS SEPERATED BY 1-2 SECONDS OF DIFFUSELY EXPRESSED MODERATE VOLTAGE 
[place].  THE EEG IS NON-REACTIVE.



HYPERVENTILATION WAS NOT PERFORMED.



PHOTIC STIMULATION WAS NOT PERFORMED.



IMPRESSION:  THIS IS A SEVERELY ABNORMAL EEG DUE TO A SUPPRESSION-BURST NON-REACTIVE 
BACKGROUND.  IN THE ABSENCE OF DEEP SEDATING MEDICATIONS OR SEVERE HYPOTHERMIA, THIS 
PATTERN INDICATES NO PROGNOSIS FOR RECOVERY.

## 2021-08-27 NOTE — RAD REPORT
EXAM DESCRIPTION:  US - Extrem Venous W Compress [place] - 8/27/2021 3:08 am

 

CLINICAL HISTORY:  PE/DVT

 

COMPARISON:  None.

 

TECHNIQUE:  Real-time sonographic evaluation of the bilateral lower extremity deep venous systems was
 performed.

 

FINDINGS:  Normal compressibility, flow augmentation, phasic flow and spontaneous flow is identified 
in both the left and right lower extremity deep venous systems. No intraluminal filling defects seen.


 

IMPRESSION:  No DVT in either lower extremity.

## 2021-08-27 NOTE — EEG
CHART:  [id number]

TEST ID#:  [id number]

DATE OF STUDY:  08/27/2021



THE EEG WAS RECORDED PORTABLE IN THE EMERGENCY ROOM ON A 17 CHANNEL MACHINE.  ELECTRODES 
WERE APPLIED IN THE USUAL MANNER USING THE INTERNATIONAL 10-20 SYSTEM.



THERE IS NO ACTIVITY OF ELECTO CEREBRAL ORIGON DURING THE ENTIRE STUDY.  NO ELECTROGRAPHIC 
ACTIVITY OCCURED OTHER THAN ARTIFACT DURING NOXIOUS STIMULATION.



IMPRESSION:  THIS IS A SEVERELY ABNORMAL EEG DUE TO THE ABSENCE OF ELECTROGRAPHIC ACTIVITY 
OF CEREBRAL ORIGIN.  IN THE ABSENCE OF SEVERE HYPOTHERMIA OR DEEP SEDATING MEDICATIONS, 
THIS EEG DEMONSTRATES BRAIN DEATH.

## 2021-08-27 NOTE — ECHO
HEIGHT: 5 ft 8 in   WEIGHT: 403 lb 7.2 oz   DATE OF STUDY: 08/27/2021   REFER DR: Zita Lomax MD

2-DIMENSIONAL: YES

     M.MODE: YES

 DOPPLER: YES

COLOR FLOW: YES



                    TDS:  

PORTABLE: 

 DEFINITY:  

BUBBLE STUDY: 





DIAGNOSIS:  CONGESTIVE HEART FAILURE



CARDIAC HISTORY:  

CATHERIZATION: 

SURGERY: 

PROSTHETIC VALVE: 

PACEMAKER: 





MEASUREMENTS (cm)

    DIASTOLIC (NORMALS)                 SYSTOLIC (NORMALS)

IVSd                 1.2 (0.6-1.2)                    LA Diam 3.6 (1.9-4.0)     LVEF       
  69%  

LVIDd               4.5 (3.5-5.7)                        LVIDs      2.8 (2.0-3.5)     %FS  
        39%

LVPWd             1.2 (0.6-1.2)

Ao Diam           2.7 (2.0-3.7)



2 DIMENSIONAL ASSESSMENT:

RIGHT ATRIUM:                   

LEFT ATRIUM:       



RIGHT VENTRICLE:            

LEFT VENTRICLE: 



TRICUSPID VALVE:             

MITRAL VALVE:     



PULMONIC VALVE:             

AORTIC VALVE:     



PERICARDIAL EFFUSION: 

AORTIC ROOT:      





LEFT VENTRICULAR WALL MOTION:     



DOPPLER/COLOR FLOW:     



COMMENTS:    VERY LIMITED WINDOWS.  LEFT VENTRICULAR EJECTION FRACTION APPEARS NORMAL 
55-60%.



TECHNOLOGIST:   RUBEN ORLANDO

## 2021-08-31 VITALS — SYSTOLIC BLOOD PRESSURE: 100 MMHG | DIASTOLIC BLOOD PRESSURE: 60 MMHG | TEMPERATURE: 98 F

## 2021-08-31 NOTE — P.DS
Discharge Date: 21


Disposition: 


Discharge Condition: GOOD


Reason for Admission: Cardiac arrest





- Problems


(1) Cardiac arrest


Status: Acute   





(2) GIB (gastrointestinal bleeding)


Status: Acute   





(3) MICHAEL (acute kidney injury)


Status: Acute   


Brief History of Present Illness: 





Patient is a 66-year-old gentlemen who he presents to our hospital with cardiac 

arrest.  Patient is a resident at [facility].  Patient was apparently

picked up from a homeless shelter in the [facility] and was transfer to [facility].  Patient was walking to the shower when patient fell.  It 

appeared patient was unresponsive and did not have a pulse so they started CPR 

and EMS was notified.  When EMS arrived patient was asystole and CPR was 

continued.  Patient was given epi x 3 and intraosseous catheter obtained in the 

tibia.  Patient was brought to our hospital were there were able to obtain a 

pulse.  Patient was hypotensive and was started on Levophed and also given 

dopamine.  Transfer was initiated to [facility]  but no bed 

availability.  At the same time EEG was performed which appear to have very 

little activity.  Initially it was felt that patient may not have any brain 

activity but when seen by Neurology patient had a burst suppression pattern.  

Patient unlikely to survive this event.  Most likely will not have much brain 

activity over the next 48-72 hr.  Will go ahead and continue with hydration and 

pressor support.  Start Protonix drip.  Get Cardiology and Pulmonary 

consultation along with a neurology consultation.  As patient's prognosis is 

poor and  hemodynamically and neurologically unstable for transfer will go ahead

and keep him at our facility for admission and repeat EEG in the a.m..





I did speak to the jail director, and they stated that the only 

information they have on the patient is his Medicare and Medicaid and number.  

They do not have a 's license nor do they have any contact information 

that is working.  They had a number to an Dave Falk but the phone number that 

we were given is not working.  Will get  to assist and try to fi

gure out a contact to the patient.


Hospital Course: 





Patient had multiple episodes of Ventricular tachyarrhythmia. Patient was 

cardioverted numerous times. Patient was given calcium, bicarb, and aggressive 

IV hydration. Patient's EEG revealed brain death. Decision was made to withdraw 

care. Patient had no family around. 


Physician Discharge Instructions: 


Patient  and body sent to  home 


Followup: 


NONE,NONE [Primary Care Provider] - 


Time spent managing pt's care (in minutes): 35